# Patient Record
Sex: MALE | Race: WHITE
[De-identification: names, ages, dates, MRNs, and addresses within clinical notes are randomized per-mention and may not be internally consistent; named-entity substitution may affect disease eponyms.]

---

## 2020-10-30 ENCOUNTER — HOSPITAL ENCOUNTER (OUTPATIENT)
Dept: HOSPITAL 56 - MW.ED | Age: 65
Setting detail: OBSERVATION
LOS: 1 days | Discharge: HOME | End: 2020-10-31
Attending: INTERNAL MEDICINE | Admitting: INTERNAL MEDICINE
Payer: MEDICARE

## 2020-10-30 DIAGNOSIS — Z20.828: ICD-10-CM

## 2020-10-30 DIAGNOSIS — Z85.038: ICD-10-CM

## 2020-10-30 DIAGNOSIS — Z79.82: ICD-10-CM

## 2020-10-30 DIAGNOSIS — I10: ICD-10-CM

## 2020-10-30 DIAGNOSIS — I63.9: Primary | ICD-10-CM

## 2020-10-30 DIAGNOSIS — G83.24: ICD-10-CM

## 2020-10-30 DIAGNOSIS — Z98.890: ICD-10-CM

## 2020-10-30 DIAGNOSIS — Z79.899: ICD-10-CM

## 2020-10-30 LAB
BUN SERPL-MCNC: 20 MG/DL (ref 7–18)
CHLORIDE SERPL-SCNC: 104 MMOL/L (ref 98–107)
CO2 SERPL-SCNC: 24.3 MMOL/L (ref 21–32)
GLUCOSE SERPL-MCNC: 110 MG/DL (ref 74–106)
HBA1C BLD-MCNC: 5.6 % (ref 4.5–6.2)
POTASSIUM SERPL-SCNC: 4.1 MMOL/L (ref 3.5–5.1)
SODIUM SERPL-SCNC: 140 MMOL/L (ref 136–148)

## 2020-10-30 PROCEDURE — 97161 PT EVAL LOW COMPLEX 20 MIN: CPT

## 2020-10-30 PROCEDURE — 84484 ASSAY OF TROPONIN QUANT: CPT

## 2020-10-30 PROCEDURE — 36415 COLL VENOUS BLD VENIPUNCTURE: CPT

## 2020-10-30 PROCEDURE — 84100 ASSAY OF PHOSPHORUS: CPT

## 2020-10-30 PROCEDURE — 70450 CT HEAD/BRAIN W/O DYE: CPT

## 2020-10-30 PROCEDURE — 85610 PROTHROMBIN TIME: CPT

## 2020-10-30 PROCEDURE — 80053 COMPREHEN METABOLIC PANEL: CPT

## 2020-10-30 PROCEDURE — 81001 URINALYSIS AUTO W/SCOPE: CPT

## 2020-10-30 PROCEDURE — 85730 THROMBOPLASTIN TIME PARTIAL: CPT

## 2020-10-30 PROCEDURE — 80061 LIPID PANEL: CPT

## 2020-10-30 PROCEDURE — 96372 THER/PROPH/DIAG INJ SC/IM: CPT

## 2020-10-30 PROCEDURE — G0378 HOSPITAL OBSERVATION PER HR: HCPCS

## 2020-10-30 PROCEDURE — 86592 SYPHILIS TEST NON-TREP QUAL: CPT

## 2020-10-30 PROCEDURE — 99285 EMERGENCY DEPT VISIT HI MDM: CPT

## 2020-10-30 PROCEDURE — 85025 COMPLETE CBC W/AUTO DIFF WBC: CPT

## 2020-10-30 PROCEDURE — 70496 CT ANGIOGRAPHY HEAD: CPT

## 2020-10-30 PROCEDURE — 70498 CT ANGIOGRAPHY NECK: CPT

## 2020-10-30 PROCEDURE — 83735 ASSAY OF MAGNESIUM: CPT

## 2020-10-30 PROCEDURE — 85652 RBC SED RATE AUTOMATED: CPT

## 2020-10-30 PROCEDURE — 83036 HEMOGLOBIN GLYCOSYLATED A1C: CPT

## 2020-10-30 PROCEDURE — 70551 MRI BRAIN STEM W/O DYE: CPT

## 2020-10-30 PROCEDURE — U0002 COVID-19 LAB TEST NON-CDC: HCPCS

## 2020-10-30 PROCEDURE — 84443 ASSAY THYROID STIM HORMONE: CPT

## 2020-10-30 PROCEDURE — 93306 TTE W/DOPPLER COMPLETE: CPT

## 2020-10-30 PROCEDURE — 93005 ELECTROCARDIOGRAM TRACING: CPT

## 2020-10-30 RX ADMIN — DIBASIC SODIUM PHOSPHATE, MONOBASIC POTASSIUM PHOSPHATE AND MONOBASIC SODIUM PHOSPHATE SCH MG: 852; 155; 130 TABLET ORAL at 20:58

## 2020-10-30 NOTE — PCM.HP.2
H&P History of Present Illness





- General


Date of Service: 10/30/20


Admit Problem/Dx: 


                           Admission Diagnosis/Problem





Admission Diagnosis/Problem      CVA, Cerebrovascular accident








Source of Information: Patient


History Limitations: Reports: No Limitations





- History of Present Illness


Initial Comments - Free Text/Narative: 





65-year-old male male presents after noticing left hand weakness that started 

this morning. He has a PMH of colon cancer. Patient reports that shortly after 

awakening at 7 AM this morning he noticed his left hand felt weak and felt numb.

He did not have any speech difficulty, facial drooping or any other limb 

weakness. He took 2 baby aspirin prior to coming to the hospital. Patient denies

any fevers, chills, sore throat, cough, SOB, chest pain, nausea, vomiting, 

abdominal pain, diarrhea, blood in stool or blood in urine. 





In the ER, EKG showed normal sinus rhythm. Patient given aspirin 162 mg x 1 and 

Plavix 600 mg x 1. CT head, CTA head and CTA neck were negative for any acute 

findings. MRI brain showed small acute cortical infarct of right mid precentral 

gyrus. ER provided contact neurologist Dr. Monsalve in [place], [place] who advised that 

transfer was not necessary for evaluation by neurologist and that he would be 

available for phone consult if needed. Patient was admitted for further 

evaluation and treatment.





- Related Data


Allergies/Adverse Reactions: 


                                    Allergies











Allergy/AdvReac Type Severity Reaction Status Date / Time


 


No Known Allergies Allergy   Verified 10/05/18 14:07











Home Medications: 


                                    Home Meds





Latanoprost [Xalatan 0.005% Ophth Soln] 1 drop EYEBOTH BEDTIME 10/05/16 

[History]


Aspirin 325 mg PO DAILY 10/05/18 [History]


Sildenafil [Revatio] 2 - 4 tab PO ASDIRECTED PRN 10/05/18 [History]











Past Medical History


HEENT History: Reports: Glaucoma, Other (See Below) (right TMJ problems)


Other HEENT History: uses glasses for driving


Cardiovascular History: Reports: Other (See Below)


Other Cardiovascular History: mild mitral regurgitation per chart, h/o HTN, h/o 

increased cholesterol, small vessel cerebrovascular disease


Respiratory History: Reports: Sleep Apnea, SOB (on exertion)


Other Respiratory History: uses CPAP


Gastrointestinal History: Reports: Colon Polyp, Hiatal Hernia, Other (See Below)

 (diverticulosis  diverticulosis)


Other Gastrointestinal History: hx of colon ca


Other Genitourinary History: UTI in July, 2016


Musculoskeletal History: Reports: Arthritis, Fracture, Gout


Other Musculoskeletal History: only takes medication during flare ups,   hx of 

fx left arm


Neurological History: Reports: Other (See Below)


Other Neuro History: was told he had "mini strokes" and was prescribed a CPAP 

machine


Psychiatric History: Reports: None


Endocrine/Metabolic History: Reports: Obesity/BMI 30+


Hematologic History: Reports: None


Immunologic History: Reports: None


Oncologic (Cancer) History: Reports: None


Dermatologic History: Reports: None





- Past Surgical History


Head Surgeries/Procedures: Reports: None


HEENT Surgical History: Reports: Tonsillectomy


GI Surgical History: Reports: Colonoscopy (x2)


Musculoskeletal Surgical History: Reports: Arthroscopic Knee





- History Comment


History Comment: denies etoh





Social & Family History





- Family History


Family Medical History: Noncontributory





- Tobacco Use


Tobacco Use Status *Q: Never Tobacco User


Second Hand Smoke Exposure: No





- Caffeine Use


Caffeine Use: Reports: Coffee





- Recreational Drug Use


Recreational Drug Use: No





H&P Review of Systems





- Review of Systems:


Review Of Systems: Comprehensive ROS is negative, except as noted in HPI.





Exam





- Exam


Exam: See Below





- Vital Signs


Vital Signs: 


                                Last Vital Signs











Temp  36.2 C   10/30/20 11:06


 


Pulse  56 L  10/30/20 13:54


 


Resp  17   10/30/20 13:54


 


BP  136/87   10/30/20 13:54


 


Pulse Ox  96   10/30/20 13:54











Weight: 85.366 kg





- Exam


General: Alert, Oriented, Cooperative, Other (NAD)


HEENT: Conjunctiva Clear, EOMI, Hearing Intact, Posterior Pharynx Clear, Pupils 

Equal, Pupils Reactive


Neck: Supple, Trachea Midline


Lungs: Clear to Auscultation, Normal Respiratory Effort


Cardiovascular: Regular Rate, Regular Rhythm


GI/Abdominal Exam: Normal Bowel Sounds, Soft, Non-Tender, No Distention


Extremities: Normal Inspection, No Pedal Edema


Neurological: Cranial Nerves Intact, Normal Speech, Normal Tone, Sensation 

Intact, Other (5/5 strength in RUE. 4/5 strength in LUE with weakened  

strength. 5/5 strength in lower extremities b/l.)


Neuro Extensive - Mental Status: Alert, Oriented x3, Normal Mood/Affect


Psychiatric: Alert, Normal Affect, Normal Mood





- Patient Data


Lab Results Last 24 hrs: 


                         Laboratory Results - last 24 hr











  10/30/20 10/30/20 10/30/20 Range/Units





  11:10 11:10 11:10 


 


WBC  8.13    (4.0-11.0)  K/uL


 


RBC  5.23    (4.50-5.90)  M/uL


 


Hgb  16.1    (13.0-17.0)  g/dL


 


Hct  48.1    (38.0-50.0)  %


 


MCV  92.0    (80.0-98.0)  fL


 


MCH  30.8    (27.0-32.0)  pg


 


MCHC  33.5    (31.0-37.0)  g/dL


 


RDW Std Deviation  44.5    (28.0-62.0)  fl


 


RDW Coeff of Violeta  13    (11.0-15.0)  %


 


Plt Count  329    (150-400)  K/uL


 


MPV  10.50    (7.40-12.00)  fL


 


Neut % (Auto)  63.5    (48.0-80.0)  %


 


Lymph % (Auto)  26.1    (16.0-40.0)  %


 


Mono % (Auto)  6.6    (0.0-15.0)  %


 


Eos % (Auto)  3.3    (0.0-7.0)  %


 


Baso % (Auto)  0.5    (0.0-1.5)  %


 


Neut # (Auto)  5.2    (1.4-5.7)  K/uL


 


Lymph # (Auto)  2.1    (0.6-2.4)  K/uL


 


Mono # (Auto)  0.5    (0.0-0.8)  K/uL


 


Eos # (Auto)  0.3    (0.0-0.7)  K/uL


 


Baso # (Auto)  0.0    (0.0-0.1)  K/uL


 


Nucleated RBC %  0.0    /100WBC


 


Nucleated RBCs #  0    K/uL


 


ESR     (0-19)  mm/hr


 


INR   1.07   


 


APTT   27.4   (18.6-31.3)  SEC


 


Sodium    140  (136-148)  mmol/L


 


Potassium    4.1  (3.5-5.1)  mmol/L


 


Chloride    104  ()  mmol/L


 


Carbon Dioxide    24.3  (21.0-32.0)  mmol/L


 


BUN    20 H  (7.0-18.0)  mg/dL


 


Creatinine    1.1  (0.8-1.3)  mg/dL


 


Est Cr Clr Drug Dosing    65.17  mL/min


 


Estimated GFR (MDRD)    > 60.0  ml/min


 


Glucose    110 H  ()  mg/dL


 


Hemoglobin A1c     (4.5-6.2)  %


 


Calcium    9.4  (8.5-10.1)  mg/dL


 


Total Bilirubin    0.4  (0.2-1.0)  mg/dL


 


AST    14 L  (15-37)  IU/L


 


ALT    25  (14-63)  IU/L


 


Alkaline Phosphatase    62  ()  U/L


 


Troponin I    < 0.050  (0.000-0.056)  ng/mL


 


Total Protein    7.4  (6.4-8.2)  g/dL


 


Albumin    4.3  (3.4-5.0)  g/dL


 


Globulin    3.1  (2.6-4.0)  g/dL


 


Albumin/Globulin Ratio    1.4  (0.9-1.6)  


 


TSH 3rd Generation    1.16  (0.36-3.74)  uIU/mL


 


Urine Color     


 


Urine Appearance     


 


Urine pH     (5.0-8.0)  


 


Ur Specific Gravity     (1.001-1.035)  


 


Urine Protein     (NEGATIVE)  mg/dL


 


Urine Glucose (UA)     (NEGATIVE)  mg/dL


 


Urine Ketones     (NEGATIVE)  mg/dL


 


Urine Occult Blood     (NEGATIVE)  


 


Urine Nitrite     (NEGATIVE)  


 


Urine Bilirubin     (NEGATIVE)  


 


Urine Urobilinogen     (<2.0)  EU/dL


 


Ur Leukocyte Esterase     (NEGATIVE)  


 


Urine RBC     (0-2/HPF)  


 


Urine WBC     (0-5/HPF)  


 


Ur Epithelial Cells     (NONE-FEW)  


 


Urine Bacteria     (NEGATIVE)  


 


SARS-CoV-2 RNA (MAXIM)     (NEGATIVE)  














  10/30/20 10/30/20 10/30/20 Range/Units





  11:10 11:10 13:15 


 


WBC     (4.0-11.0)  K/uL


 


RBC     (4.50-5.90)  M/uL


 


Hgb     (13.0-17.0)  g/dL


 


Hct     (38.0-50.0)  %


 


MCV     (80.0-98.0)  fL


 


MCH     (27.0-32.0)  pg


 


MCHC     (31.0-37.0)  g/dL


 


RDW Std Deviation     (28.0-62.0)  fl


 


RDW Coeff of Violeta     (11.0-15.0)  %


 


Plt Count     (150-400)  K/uL


 


MPV     (7.40-12.00)  fL


 


Neut % (Auto)     (48.0-80.0)  %


 


Lymph % (Auto)     (16.0-40.0)  %


 


Mono % (Auto)     (0.0-15.0)  %


 


Eos % (Auto)     (0.0-7.0)  %


 


Baso % (Auto)     (0.0-1.5)  %


 


Neut # (Auto)     (1.4-5.7)  K/uL


 


Lymph # (Auto)     (0.6-2.4)  K/uL


 


Mono # (Auto)     (0.0-0.8)  K/uL


 


Eos # (Auto)     (0.0-0.7)  K/uL


 


Baso # (Auto)     (0.0-0.1)  K/uL


 


Nucleated RBC %     /100WBC


 


Nucleated RBCs #     K/uL


 


ESR  1    (0-19)  mm/hr


 


INR     


 


APTT     (18.6-31.3)  SEC


 


Sodium     (136-148)  mmol/L


 


Potassium     (3.5-5.1)  mmol/L


 


Chloride     ()  mmol/L


 


Carbon Dioxide     (21.0-32.0)  mmol/L


 


BUN     (7.0-18.0)  mg/dL


 


Creatinine     (0.8-1.3)  mg/dL


 


Est Cr Clr Drug Dosing     mL/min


 


Estimated GFR (MDRD)     ml/min


 


Glucose     ()  mg/dL


 


Hemoglobin A1c   5.6   (4.5-6.2)  %


 


Calcium     (8.5-10.1)  mg/dL


 


Total Bilirubin     (0.2-1.0)  mg/dL


 


AST     (15-37)  IU/L


 


ALT     (14-63)  IU/L


 


Alkaline Phosphatase     ()  U/L


 


Troponin I     (0.000-0.056)  ng/mL


 


Total Protein     (6.4-8.2)  g/dL


 


Albumin     (3.4-5.0)  g/dL


 


Globulin     (2.6-4.0)  g/dL


 


Albumin/Globulin Ratio     (0.9-1.6)  


 


TSH 3rd Generation     (0.36-3.74)  uIU/mL


 


Urine Color    YELLOW  


 


Urine Appearance    CLEAR  


 


Urine pH    7.0  (5.0-8.0)  


 


Ur Specific Gravity    <= 1.005  (1.001-1.035)  


 


Urine Protein    NEGATIVE  (NEGATIVE)  mg/dL


 


Urine Glucose (UA)    NEGATIVE  (NEGATIVE)  mg/dL


 


Urine Ketones    NEGATIVE  (NEGATIVE)  mg/dL


 


Urine Occult Blood    NEGATIVE  (NEGATIVE)  


 


Urine Nitrite    NEGATIVE  (NEGATIVE)  


 


Urine Bilirubin    NEGATIVE  (NEGATIVE)  


 


Urine Urobilinogen    0.2  (<2.0)  EU/dL


 


Ur Leukocyte Esterase    NEGATIVE  (NEGATIVE)  


 


Urine RBC    0-1  (0-2/HPF)  


 


Urine WBC    0-1  (0-5/HPF)  


 


Ur Epithelial Cells    RARE  (NONE-FEW)  


 


Urine Bacteria    RARE  (NEGATIVE)  


 


SARS-CoV-2 RNA (MAXIM)     (NEGATIVE)  














  10/30/20 Range/Units





  13:35 


 


WBC   (4.0-11.0)  K/uL


 


RBC   (4.50-5.90)  M/uL


 


Hgb   (13.0-17.0)  g/dL


 


Hct   (38.0-50.0)  %


 


MCV   (80.0-98.0)  fL


 


MCH   (27.0-32.0)  pg


 


MCHC   (31.0-37.0)  g/dL


 


RDW Std Deviation   (28.0-62.0)  fl


 


RDW Coeff of Violeta   (11.0-15.0)  %


 


Plt Count   (150-400)  K/uL


 


MPV   (7.40-12.00)  fL


 


Neut % (Auto)   (48.0-80.0)  %


 


Lymph % (Auto)   (16.0-40.0)  %


 


Mono % (Auto)   (0.0-15.0)  %


 


Eos % (Auto)   (0.0-7.0)  %


 


Baso % (Auto)   (0.0-1.5)  %


 


Neut # (Auto)   (1.4-5.7)  K/uL


 


Lymph # (Auto)   (0.6-2.4)  K/uL


 


Mono # (Auto)   (0.0-0.8)  K/uL


 


Eos # (Auto)   (0.0-0.7)  K/uL


 


Baso # (Auto)   (0.0-0.1)  K/uL


 


Nucleated RBC %   /100WBC


 


Nucleated RBCs #   K/uL


 


ESR   (0-19)  mm/hr


 


INR   


 


APTT   (18.6-31.3)  SEC


 


Sodium   (136-148)  mmol/L


 


Potassium   (3.5-5.1)  mmol/L


 


Chloride   ()  mmol/L


 


Carbon Dioxide   (21.0-32.0)  mmol/L


 


BUN   (7.0-18.0)  mg/dL


 


Creatinine   (0.8-1.3)  mg/dL


 


Est Cr Clr Drug Dosing   mL/min


 


Estimated GFR (MDRD)   ml/min


 


Glucose   ()  mg/dL


 


Hemoglobin A1c   (4.5-6.2)  %


 


Calcium   (8.5-10.1)  mg/dL


 


Total Bilirubin   (0.2-1.0)  mg/dL


 


AST   (15-37)  IU/L


 


ALT   (14-63)  IU/L


 


Alkaline Phosphatase   ()  U/L


 


Troponin I   (0.000-0.056)  ng/mL


 


Total Protein   (6.4-8.2)  g/dL


 


Albumin   (3.4-5.0)  g/dL


 


Globulin   (2.6-4.0)  g/dL


 


Albumin/Globulin Ratio   (0.9-1.6)  


 


TSH 3rd Generation   (0.36-3.74)  uIU/mL


 


Urine Color   


 


Urine Appearance   


 


Urine pH   (5.0-8.0)  


 


Ur Specific Gravity   (1.001-1.035)  


 


Urine Protein   (NEGATIVE)  mg/dL


 


Urine Glucose (UA)   (NEGATIVE)  mg/dL


 


Urine Ketones   (NEGATIVE)  mg/dL


 


Urine Occult Blood   (NEGATIVE)  


 


Urine Nitrite   (NEGATIVE)  


 


Urine Bilirubin   (NEGATIVE)  


 


Urine Urobilinogen   (<2.0)  EU/dL


 


Ur Leukocyte Esterase   (NEGATIVE)  


 


Urine RBC   (0-2/HPF)  


 


Urine WBC   (0-5/HPF)  


 


Ur Epithelial Cells   (NONE-FEW)  


 


Urine Bacteria   (NEGATIVE)  


 


SARS-CoV-2 RNA (MAXIM)  NEGATIVE  (NEGATIVE)  











Result Diagrams: 


                                 10/30/20 11:10





                                 10/30/20 11:10





Sepsis Event Note





- Evaluation


Sepsis Screening Result: No Definite Risk





- Focused Exam


Vital Signs: 


                                   Vital Signs











  Temp Pulse Resp BP Pulse Ox


 


 10/30/20 13:54   56 L  17  136/87  96


 


 10/30/20 11:51   58 L  17  142/88 H  96


 


 10/30/20 11:36   60  17  149/92 H  96


 


 10/30/20 11:21   58 L  17  142/88 H  97


 


 10/30/20 11:06  36.2 C  55 L  17  152/102 H  97














- Problem List


(1) CVA (cerebral vascular accident)


SNOMED Code(s): 404982572


   ICD Code: I63.9 - CEREBRAL INFARCTION, UNSPECIFIED   Status: Acute   Current 

Visit: Yes   


Qualifiers: 


   CVA mechanism: thrombosis   Precerebral and cerebral artery: unspecified 

precerebral artery   Qualified Code(s): I63.00 - Cerebral infarction due to 

thrombosis of unspecified precerebral artery   





(2) History of colon cancer


SNOMED Code(s): 554075883


   ICD Code: Z85.038 - PERSONAL HISTORY OF MALIGNANT NEOPLASM OF LARGE INTESTINE

   Status: Acute   Current Visit: Yes   


Problem List Initiated/Reviewed/Updated: Yes


Orders Last 24hrs: 


                               Active Orders 24 hr











 Category Date Time Status


 


 Patient Status [ADT] Routine ADT  10/30/20 14:48 Active


 


 Bedrest [RC] ASDIRECTED Care  10/30/20 11:01 Active


 


 Cardiac Monitoring [RC] .AS DIRECTED Care  10/30/20 11:01 Active


 


 EKG Documentation Completion [RC] STAT Care  10/30/20 11:01 Active


 


 Initiate Acute Stroke Protocol [RC] STAT Care  10/30/20 11:01 Active


 


 NIH Stroke Scale [RC] ASDIRECTED Care  10/30/20 11:01 Active


 


 Neuro Check [RC] Q2H Care  10/30/20 14:54 Active


 


 Nursing Bedside Swallow Screen [RC] ASDIRECTED Care  10/30/20 14:52 Active


 


 Oxygen Therapy [RC] ASDIRECTED Care  10/30/20 11:01 Active


 


 Oxygen Therapy [RC] PRN Care  10/30/20 14:50 Active


 


 Up ad Ernestine [RC] ASDIRECTED Care  10/30/20 14:50 Active


 


 VTE/DVT Education [RC] PER UNIT ROUTINE Care  10/30/20 14:50 Active


 


 Vital Signs [RC] Q15M Care  10/30/20 11:01 Active


 


 Vital Signs [RC] Q4H Care  10/30/20 14:50 Active


 


 OT Evaluation and Treatment [CONS] Routine Cons  10/30/20 14:50 Active


 


 PT Evaluation and Treatment [CONS] Routine Cons  10/30/20 14:50 Active


 


 Echo Comp wo Cont [US] Urgent Exams  10/30/20 14:55 Ordered


 


 CBC WITH AUTO DIFF [HEME] AM Lab  10/31/20 05:11 Ordered


 


 COMPREHENSIVE METABOLIC PN,CMP [CHEM] AM Lab  10/31/20 05:11 Ordered


 


 LIPID PANEL [CHEM] Routine Lab  10/30/20 14:53 Ordered


 


 MAGNESIUM [CHEM] Routine Lab  10/30/20 14:53 Ordered


 


 PHOSPHORUS [CHEM] Routine Lab  10/30/20 14:53 Ordered


 


 RPR (SYPHILIS SERO) W/ RFLX [REF] Stat Lab  10/30/20 11:10 Received


 


 Acetaminophen [TylenoL] Med  10/30/20 14:50 Active





 650 mg PO Q4H PRN   


 


 Aspirin Med  10/31/20 09:00 Active





 81 mg PO DAILY   


 


 Enoxaparin [Lovenox] Med  10/30/20 21:00 Active





 40 mg SUBCUT Q24H   


 


 Ondansetron [Zofran] Med  10/30/20 14:50 Active





 4 mg IVPUSH Q4H PRN   


 


 Sodium Chloride 0.9% [Normal Saline] Med  10/30/20 11:01 Active





 10 ml IV ASDIRECTED PRN   


 


 Sodium Chloride 0.9% [Saline Flush] Med  10/30/20 11:01 Active





 10 ml FLUSH ASDIRECTED PRN   


 


 Sodium Chloride 0.9% [Saline Flush] Med  10/30/20 11:01 Active





 2.5 ml FLUSH ASDIRECTED PRN   


 


 atorvaSTATin [Lipitor] Med  10/31/20 09:00 Active





 40 mg PO DAILY   


 


 Peripheral IV Insertion Adult [OM.PC] Stat Oth  10/30/20 11:01 Ordered


 


 Peripheral IV Insertion Adult [OM.PC] Stat Oth  10/30/20 11:01 Ordered


 


 Resuscitation Status Routine Resus Stat  10/30/20 14:50 Ordered








                                Medication Orders





Acetaminophen (Tylenol)  650 mg PO Q4H PRN


   PRN Reason: Pain (Mild 1-3)/fever


Aspirin (Aspirin)  81 mg PO DAILY KAMRAN


Atorvastatin Calcium (Lipitor)  40 mg PO DAILY KAMRAN


Enoxaparin Sodium (Lovenox)  40 mg SUBCUT Q24H KAMRAN


Ondansetron HCl (Zofran)  4 mg IVPUSH Q4H PRN


   PRN Reason: Nausea


Sodium Chloride (Saline Flush)  10 ml FLUSH ASDIRECTED PRN


   PRN Reason: Keep Vein Open


   Last Admin: 10/30/20 12:54  Dose: 10 ml


   Documented by: ESTEE


Sodium Chloride (Saline Flush)  2.5 ml FLUSH ASDIRECTED PRN


   PRN Reason: Keep Vein Open


   Last Admin: 10/30/20 12:54  Dose: 2.5 ml


   Documented by: ESTEE


Sodium Chloride (Normal Saline)  10 ml IV ASDIRECTED PRN


   PRN Reason: IV Use








Assessment/Plan Comment:: 





Assessment and Plan:





1. CVA:


- Admit to med/surg. Patient on telemetry. Will order ECHO. Neuro checks q4h. 

Will order bedside swallow study prior to starting diet. Will check HgbA1c, 

lipid panel and TSH. Continue aspirin and atorvastatin. Will allow for 

permissive HTN.


- MRI brain showed small acute cortical infarct of right mid precentral gyrus. 

There is are also small chronic infarcts in left anterior frontal lobe and bila

teral cerebellar hemispheres.


- CT head, CTA head and neck were negative. 





2. Left upper extremity weakness secondary to #1:


- Consult PT/OT.





3. DVT prophylaxis:


- Lovenox 40 mg subcut qd.

## 2020-10-30 NOTE — CT
DATE: 10/30/2020. 



CLINICAL HISTORY:



Patient with acute onset of left upper extremity weakness. 



TECHNIQUE:



Standard helical CT image acquisition of the brain following by standard 

helical CT image acquisition through the head and neck after intravenous 

contrast bolus enhancement. Multiplanar reconstructed images performed on a 

separate workstation.



COMPARISON:



Head CT dated 03/29/2013. 



FINDINGS:



CT HEAD:



There is no intracranial hemorrhage.



No extra-axial collection, mass effect, or midline shift.



Patchy hypoattenuation within the white matter of both hemispheres likely 

reflects sequela of chronic small vessel ischemia.



Ventricles are normal in size and morphology for patient age.



The calvarium is unremarkable.



The orbits are unremarkable.



Findings consistent with chronic sinusitis involving the bilateral 

maxillary and frontal sinuses as well as the anterior ethmoid air cells. 



The mastoid air cells are unremarkable.



The soft tissues are unremarkable.



CT ANGIOGRAM HEAD AND NECK:



The origins of the great vessels from the aortic arch are patent. The 

origins of the right and left vertebral arteries are patent.



The common carotid arteries are patent.



There is no significant stenosis at the origin of the right internal 

carotid artery.



There is no significant stenosis at the origin of the left internal carotid 

artery.



The rest of the cervical segments of the internal carotid arteries are 

patent up to their intracranial segments, noting marked tortuosity the 

proximal and mid cervical segment of the right internal carotid artery and 

a tortuous subpetrosal loop of the distal cervical left internal carotid 

artery. 



The intracranial segments of the internal carotid arteries are patent.



The anterior and middle cerebral arteries are patent. Note is made of early 

trifurcation of the left middle cerebral artery. The anterior communicating 

artery is visualized and is within normal limits.



The vertebral arteries are codominant. The cervical segments of the 

vertebral arteries are patent. The intracranial segments of the vertebral 

arteries are patent.



The basilar trunk and posterior cerebral arteries are patent.



There is normal opacification of major intracranial venous structures.



The visualized lung apices are unremarkable



There are multiple small nodules within the right greater than left thyroid 

lobes with the largest measuring approximately 7 mm and located within the 

posterior right lobe. 



There are degenerative changes in the cervical spine.



IMPRESSION:



1. No acute intracranial hemorrhage. 



2. Findings consistent with sequela of chronic small vessel ischemia. 



3. Normal CT angiogram of the head and neck. More specifically, no evidence 

of intracranial proximal large vessel occlusion or hemodynamically 

significant stenosis within the cervical arterial vasculature.



Please note that all CT scans at this facility use dose modulation, 

iterative reconstruction, and/or weight-based dosing when appropriate to 

reduce radiation dose to as low as reasonably achievable.



Dictated by Fantasma Covington MD @ Oct 30 2020 11:34AM



Signed by Dr. Fantasma Covington @ Oct 30 2020 12:05PM

## 2020-10-30 NOTE — CT
DATE: 10/30/2020. 



CLINICAL HISTORY:



Patient with acute onset of left upper extremity weakness. 



TECHNIQUE:



Standard helical CT image acquisition of the brain following by standard 

helical CT image acquisition through the head and neck after intravenous 

contrast bolus enhancement. Multiplanar reconstructed images performed on a 

separate workstation.



COMPARISON:



Head CT dated 03/29/2013. 



FINDINGS:



CT HEAD:



There is no intracranial hemorrhage.



No extra-axial collection, mass effect, or midline shift.



Patchy hypoattenuation within the white matter of both hemispheres likely 

reflects sequela of chronic small vessel ischemia.



Ventricles are normal in size and morphology for patient age.



The calvarium is unremarkable.



The orbits are unremarkable.



Findings consistent with chronic sinusitis involving the bilateral 

maxillary and frontal sinuses as well as the anterior ethmoid air cells. 



The mastoid air cells are unremarkable.



The soft tissues are unremarkable.



CT ANGIOGRAM HEAD AND NECK:



The origins of the great vessels from the aortic arch are patent. The 

origins of the right and left vertebral arteries are patent.



The common carotid arteries are patent.



There is no significant stenosis at the origin of the right internal 

carotid artery.



There is no significant stenosis at the origin of the left internal carotid 

artery.



The rest of the cervical segments of the internal carotid arteries are 

patent up to their intracranial segments, noting marked tortuosity the 

proximal and mid cervical segment of the right internal carotid artery and 

a tortuous subpetrosal loop of the distal cervical left internal carotid 

artery. 



The intracranial segments of the internal carotid arteries are patent.



The anterior and middle cerebral arteries are patent. Note is made of early 

trifurcation of the left middle cerebral artery. The anterior communicating 

artery is visualized and is within normal limits.



The vertebral arteries are codominant. The cervical segments of the 

vertebral arteries are patent. The intracranial segments of the vertebral 

arteries are patent.



The basilar trunk and posterior cerebral arteries are patent.



There is normal opacification of major intracranial venous structures.



The visualized lung apices are unremarkable



There are multiple small nodules within the right greater than left thyroid 

lobes with the largest measuring approximately 7 mm and located within the 

posterior right lobe. 



There are degenerative changes in the cervical spine.



IMPRESSION:



1. No acute intracranial hemorrhage. 



2. Findings consistent with sequela of chronic small vessel ischemia. 



3. Normal CT angiogram of the head and neck. More specifically, no evidence 

of intracranial proximal large vessel occlusion or hemodynamically 

significant stenosis within the cervical arterial vasculature.



Please note that all CT scans at this facility use dose modulation, 

iterative reconstruction, and/or weight-based dosing when appropriate to 

reduce radiation dose to as low as reasonably achievable.



Dictated by Fantasma Covington MD @ Oct 30 2020 11:34AM



Signed by Dr. Fantasma Covington @ Oct 30 2020 12:06PM

## 2020-10-30 NOTE — EDM.PDOC
ED HPI GENERAL MEDICAL PROBLEM





- General


Chief Complaint: Neuro Symptoms/Deficits


Stated Complaint: STROKE SYMPTOMS


Time Seen by Provider: 10/30/20 11:05


Source of Information: Reports: Patient


History Limitations: Reports: No Limitations





- History of Present Illness


INITIAL COMMENTS - FREE TEXT/NARRATIVE: 





65-year-old male no significant past medical history presents for concern for 

stroke.  Patient was seen in an outpatient clinic by  this morning.  

He woke up around 7 AM and states that shortly after waking up he noticed 

weakness of his left upper extremity.  It is primarily noticed in his fourth and

fifth digits of his left hand.  No associated numbness or tingling sensation, 

sensory deficit.  No weakness of lower extremities.  Denies slurred speech, 

confusion, word finding difficulty.  He took 2 baby aspirins at home prior to 

arrival.  He thinks that symptoms were not present after waking up and states 

that they happen shortly after waking up, but he says that he cannot be certain.





- Related Data


                                    Allergies











Allergy/AdvReac Type Severity Reaction Status Date / Time


 


No Known Allergies Allergy   Verified 10/05/18 14:07











Home Meds: 


                                    Home Meds





Latanoprost [Xalatan 0.005% Ophth Soln] 1 drop EYEBOTH BEDTIME 10/05/16 

[History]


Aspirin 325 mg PO DAILY 10/05/18 [History]


Sildenafil [Revatio] 2 - 4 tab PO ASDIRECTED PRN 10/05/18 [History]











Past Medical History


HEENT History: Reports: Glaucoma, Other (See Below) (right TMJ problems)


Other HEENT History: uses glasses for driving


Cardiovascular History: Reports: Other (See Below)


Other Cardiovascular History: mild mitral regurgitation per chart, h/o HTN, h/o 

increased cholesterol, small vessel cerebrovascular disease


Respiratory History: Reports: Sleep Apnea, SOB (on exertion)


Other Respiratory History: uses CPAP


Gastrointestinal History: Reports: Colon Polyp, Hiatal Hernia, Other (See Below)

 (diverticulosis  diverticulosis)


Other Genitourinary History: UTI in July, 2016


Musculoskeletal History: Reports: Arthritis, Fracture, Gout


Other Musculoskeletal History: only takes medication during flare ups,   hx of 

fx left arm


Neurological History: Reports: Other (See Below)


Other Neuro History: was told he had "mini strokes" and was prescribed a CPAP 

machine


Psychiatric History: Reports: None


Endocrine/Metabolic History: Reports: Obesity/BMI 30+


Hematologic History: Reports: None


Immunologic History: Reports: None


Oncologic (Cancer) History: Reports: None


Dermatologic History: Reports: None





- Past Surgical History


Head Surgeries/Procedures: Reports: None


HEENT Surgical History: Reports: Tonsillectomy


GI Surgical History: Reports: Colonoscopy (x2)


Musculoskeletal Surgical History: Reports: Arthroscopic Knee





- History Comment


History Comment: denies etoh





ED ROS GENERAL





- Review of Systems


Review Of Systems: Comprehensive ROS is negative, except as noted in HPI.





ED EXAM, GENERAL





- Physical Exam


Exam: See Below


Exam Limited By: No Limitations


General Appearance: Alert, WD/WN, No Apparent Distress


Eye Exam: Bilateral Eye: EOMI, PERRL


Ears: Normal External Exam


Nose: Normal Inspection


Throat/Mouth: Normal Inspection, Normal Voice, No Airway Compromise, Perioral 

Cyanosis


Head: Atraumatic


Neck: Normal Inspection


Respiratory/Chest: No Respiratory Distress, Lungs Clear, Normal Breath Sounds, 

No Accessory Muscle Use


Cardiovascular: Normal Peripheral Pulses, Regular Rate, Rhythm, No Edema


GI/Abdominal: Soft, Non-Tender


Back Exam: Normal Inspection


Extremities: Normal Inspection


Neurological: Alert, Oriented, CN II-XII Intact, Normal Cognition, Normal Gait, 

Other (no sensory deficits, no pronater drift b/l UE, no drift LE, normal mm 

strength b/l LE, normal  strength RUE, reduced  strength 4/5 LUE)


Psychiatric: Normal Affect, Normal Mood


Skin Exam: Warm, Dry, Intact, Normal Color


  ** #1 Interpretation


EKG Date: 10/30/20


Time: 11:00


Rhythm: NSR


Rate (Beats/Min): 65


Axis: Normal


P-Wave: Present


QRS: Normal


ST-T: Normal


QT: Normal


NH/PQ Interval: 186





Course





- Vital Signs


Last Recorded V/S: 


                                Last Vital Signs











Temp  97.2 F   10/30/20 11:06


 


Pulse  55 L  10/30/20 11:06


 


Resp  17   10/30/20 11:06


 


BP  152/102 H  10/30/20 11:06


 


Pulse Ox  97   10/30/20 11:06














- Orders/Labs/Meds


Orders: 


                               Active Orders 24 hr











 Category Date Time Status


 


 Assess Neurological Status [RC] ASDIRECTED Care  10/30/20 11:01 Active


 


 Bedrest [RC] ASDIRECTED Care  10/30/20 11:01 Active


 


 Cardiac Monitoring [RC] .AS DIRECTED Care  10/30/20 11:01 Active


 


 EKG Documentation Completion [RC] STAT Care  10/30/20 11:01 Active


 


 Initiate Acute Stroke Protocol [RC] STAT Care  10/30/20 11:01 Active


 


 NIH Stroke Scale [RC] ASDIRECTED Care  10/30/20 11:01 Active


 


 Nursing Bedside Swallow Screen [RC] ASDIRECTED Care  10/30/20 11:01 Active


 


 Oxygen Therapy [RC] ASDIRECTED Care  10/30/20 11:01 Active


 


 Vital Signs [RC] Q15M Care  10/30/20 11:01 Active


 


 COMPREHENSIVE METABOLIC PN,CMP [CHEM] Stat Lab  10/30/20 11:10 Received


 


 TROPONIN I [CHEM] Stat Lab  10/30/20 11:10 Received


 


 TSH [CHEM] Stat Lab  10/30/20 11:10 Received


 


 Sodium Chloride 0.9% [Normal Saline] Med  10/30/20 11:01 Active





 10 ml IV ASDIRECTED PRN   


 


 Sodium Chloride 0.9% [Saline Flush] Med  10/30/20 11:01 Active





 10 ml FLUSH ASDIRECTED PRN   


 


 Sodium Chloride 0.9% [Saline Flush] Med  10/30/20 11:01 Active





 2.5 ml FLUSH ASDIRECTED PRN   


 


 Peripheral IV Insertion Adult [OM.PC] Stat Oth  10/30/20 11:01 Ordered


 


 Peripheral IV Insertion Adult [OM.PC] Stat Oth  10/30/20 11:01 Ordered








                                Medication Orders





Sodium Chloride (Saline Flush)  10 ml FLUSH ASDIRECTED PRN


   PRN Reason: Keep Vein Open


   Last Admin: 10/30/20 12:54  Dose: 10 ml


   Documented by: ARANANG


Sodium Chloride (Saline Flush)  2.5 ml FLUSH ASDIRECTED PRN


   PRN Reason: Keep Vein Open


   Last Admin: 10/30/20 12:54  Dose: 2.5 ml


   Documented by: ARANANG


Sodium Chloride (Normal Saline)  10 ml IV ASDIRECTED PRN


   PRN Reason: IV Use








Labs: 


                                Laboratory Tests











  10/30/20 10/30/20 10/30/20 Range/Units





  11:10 11:10 13:15 


 


WBC  8.13    (4.0-11.0)  K/uL


 


RBC  5.23    (4.50-5.90)  M/uL


 


Hgb  16.1    (13.0-17.0)  g/dL


 


Hct  48.1    (38.0-50.0)  %


 


MCV  92.0    (80.0-98.0)  fL


 


MCH  30.8    (27.0-32.0)  pg


 


MCHC  33.5    (31.0-37.0)  g/dL


 


RDW Std Deviation  44.5    (28.0-62.0)  fl


 


RDW Coeff of Violeta  13    (11.0-15.0)  %


 


Plt Count  329    (150-400)  K/uL


 


MPV  10.50    (7.40-12.00)  fL


 


Neut % (Auto)  63.5    (48.0-80.0)  %


 


Lymph % (Auto)  26.1    (16.0-40.0)  %


 


Mono % (Auto)  6.6    (0.0-15.0)  %


 


Eos % (Auto)  3.3    (0.0-7.0)  %


 


Baso % (Auto)  0.5    (0.0-1.5)  %


 


Neut # (Auto)  5.2    (1.4-5.7)  K/uL


 


Lymph # (Auto)  2.1    (0.6-2.4)  K/uL


 


Mono # (Auto)  0.5    (0.0-0.8)  K/uL


 


Eos # (Auto)  0.3    (0.0-0.7)  K/uL


 


Baso # (Auto)  0.0    (0.0-0.1)  K/uL


 


Nucleated RBC %  0.0    /100WBC


 


Nucleated RBCs #  0    K/uL


 


INR   1.07   


 


APTT   27.4   (18.6-31.3)  SEC


 


Urine Color    YELLOW  


 


Urine Appearance    CLEAR  


 


Urine pH    7.0  (5.0-8.0)  


 


Ur Specific Gravity    <= 1.005  (1.001-1.035)  


 


Urine Protein    NEGATIVE  (NEGATIVE)  mg/dL


 


Urine Glucose (UA)    NEGATIVE  (NEGATIVE)  mg/dL


 


Urine Ketones    NEGATIVE  (NEGATIVE)  mg/dL


 


Urine Occult Blood    NEGATIVE  (NEGATIVE)  


 


Urine Nitrite    NEGATIVE  (NEGATIVE)  


 


Urine Bilirubin    NEGATIVE  (NEGATIVE)  


 


Urine Urobilinogen    0.2  (<2.0)  EU/dL


 


Ur Leukocyte Esterase    NEGATIVE  (NEGATIVE)  


 


Urine RBC    0-1  (0-2/HPF)  


 


Urine WBC    0-1  (0-5/HPF)  


 


Ur Epithelial Cells    RARE  (NONE-FEW)  


 


Urine Bacteria    RARE  (NEGATIVE)  











Meds: 


Medications











Generic Name Dose Route Start Last Admin





  Trade Name Freq  PRN Reason Stop Dose Admin


 


Sodium Chloride  10 ml  10/30/20 11:01  10/30/20 12:54





  Saline Flush  FLUSH   10 ml





  ASDIRECTED PRN   Administration





  Keep Vein Open  


 


Sodium Chloride  2.5 ml  10/30/20 11:01  10/30/20 12:54





  Saline Flush  FLUSH   2.5 ml





  ASDIRECTED PRN   Administration





  Keep Vein Open  


 


Sodium Chloride  10 ml  10/30/20 11:01 





  Normal Saline  IV  





  ASDIRECTED PRN  





  IV Use  














Discontinued Medications














Generic Name Dose Route Start Last Admin





  Trade Name Robertoq  PRN Reason Stop Dose Admin


 


Aspirin  162 mg  10/30/20 13:06  10/30/20 13:28





  Aspirin  PO  10/30/20 13:07  162 mg





  ONETIME ONE   Administration


 


Clopidogrel Bisulfate  600 mg  10/30/20 13:05  10/30/20 13:29





  Plavix  PO  10/30/20 13:06  600 mg





  ONETIME ONE   Administration


 


Iopamidol  100 ml  10/30/20 11:29  10/30/20 11:29





  Isovue Multipack-370 (76%)  IVPUSH  10/30/20 11:30  100 ml





  ONETIME ONE   Administration














- Re-Assessments/Exams


Free Text/Narrative Re-Assessment/Exam: 





10/30/20 11:23


Patient presents with concern for CVA.  Physical exam is remarkable for mild 

left upper extremity weakness.  Possible ulnar nerve neuropathy considering 

distribution of patient's symptoms.  A stroke code was called, and patient was 

taken for emergent head CT which was read by me as nonhemorrhagic.  We will 

follow up CTA head and neck, MRI brain, labs, and disposition accordingly


10/30/20 12:14


CT head, CTA head and neck both normal.  Patient was able to go for MRI, results

 are pending.  Will follow up and disposition accordingly.


10/30/20 13:33


Spoke with neurologist at my not Dr. Monsalve who recommends physical therapy 

consult, occupational therapy consult, carotid Doppler ultrasounds, TSH, RPR, 

ESR, fasting lipid panel, hemoglobin A1c, and starting a statin medication.  He 

does not think that the patient requires transfer if this can all be done at our

 hospital.  He notes that he is available for consultation should the inpatient 

team have any questions.


10/30/20 13:48


Hospitalist is comfortable with plan.  Will admit to hospital





Departure





- Departure


Time of Disposition: 13:48


Disposition: Admitted As Inpatient 64


Condition: Good


Clinical Impression: 


CVA (cerebral vascular accident)


Qualifiers:


 CVA mechanism: thrombosis Precerebral and cerebral artery: unspecified 

precerebral artery Qualified Code(s): I63.00 - Cerebral infarction due to 

thrombosis of unspecified precerebral artery








- Discharge Information


Referrals: 


Burt Salgado MD [Primary Care Provider] - 


Forms:  ED Department Discharge





Sepsis Event Note (ED)





- Focused Exam


Vital Signs: 


                                   Vital Signs











  Temp Pulse Resp BP Pulse Ox


 


 10/30/20 11:06  97.2 F  55 L  17  152/102 H  97

## 2020-10-30 NOTE — MR
INDICATION:



Left upper extremity weakness.



TECHNIQUE:



Brain MRI without contrast.



The following sequences were obtained:



3D T1 weighted sequence.



DWI and ADC mapping sequences.



Axial FLAIR and MELITON T2 weighted sequences.



Axial SWI sequence.



COMPARISON:



Head CT from 10/30/2020.



FINDINGS:



Small oblong focus of true diffusion restriction involving the right mid 

precentral gyrus and subjacent centrum semiovale, compatible with an acute 

infarction no recent infarcts elsewhere within the brain. Tiny chronic 

infarct left anterior frontal lobe. Several small chronic infarcts within 

the bilateral cerebellar hemispheres. No evidence of acute or chronic 

intracranial blood products. Mild generalized parenchymal volume loss. 

Patchy T2 hyperintensities within the supratentorial white matter, most 

likely reflecting chronic microvascular ischemic changes. No mass effect or 

herniation. No hydrocephalus or extra-axial collections. The pituitary 

gland, parasellar structures and optic chiasm are normal. All the major 

intracranial vascular structures demonstrate normal flow-related signal. 



The orbital contents are normal. No calvarial or skull base marrow signal 

abnormality. Diffuse moderate paranasal sinus mucosal thickening. No 

extracranial soft tissue findings.



IMPRESSION:



1. Small acute cortical infarct right mid precentral gyrus, which 

corresponds to the provided symptoms of left upper extremity weakness. No 

acute infarctions elsewhere within the brain. 



2. No acute or chronic intracranial hemorrhage. 



3. Small chronic cortical infarct left anterior frontal lobe. Small chronic 

infarcts bilateral cerebellar hemispheres. Minimal chronic microvascular 

ischemic changes within supratentorial white matter.



Dictated by French Cai MD @ Oct 30 2020 12:34PM



Signed by Dr. French Cai @ Oct 30 2020 12:40PM

## 2020-10-31 VITALS — DIASTOLIC BLOOD PRESSURE: 70 MMHG | SYSTOLIC BLOOD PRESSURE: 102 MMHG

## 2020-10-31 VITALS — HEART RATE: 63 BPM

## 2020-10-31 LAB
BUN SERPL-MCNC: 18 MG/DL (ref 7–18)
CHLORIDE SERPL-SCNC: 106 MMOL/L (ref 98–107)
CO2 SERPL-SCNC: 29 MMOL/L (ref 21–32)
GLUCOSE SERPL-MCNC: 98 MG/DL (ref 74–106)
POTASSIUM SERPL-SCNC: 4.5 MMOL/L (ref 3.5–5.1)
SODIUM SERPL-SCNC: 142 MMOL/L (ref 136–148)

## 2020-10-31 RX ADMIN — DIBASIC SODIUM PHOSPHATE, MONOBASIC POTASSIUM PHOSPHATE AND MONOBASIC SODIUM PHOSPHATE SCH MG: 852; 155; 130 TABLET ORAL at 09:15

## 2020-10-31 NOTE — [id number]
**Discharge Summary





- Hospital Course


Free Text/Narrative:: 





65-year-old male admitted for acute CVA. He has a PMH of RENE on CPAP and colon 

CA. Patient presented with complaints of LUE weakness. CT head, CTA head and 

neck were negative. MRI brain showed small acute cortical infarct of right mid 

precentral gyrus. There are also small chronic infarcts in the left anterior 

frontal lobe and bilateral cerebellar hemispheres. ECHO was ordered and is 

pending at time of discharge. PT consulted and recommended outpatient follow-up 

with occupational therapy. Patient discharged in stable condition with scripts 

for baby aspirin and atorvastatin daily. Advised to follow-up with PCP and 

neurology on discharge. All questions were addressed.





- Discharge Data


Discharge Date: 10/31/20


Discharge Disposition: Home, Self-Care 01


Condition: Stable





- Referral to Home Health


Primary Care Physician: 


Burt Salgado MD








- Discharge Diagnosis/Problem(s)


(1) CVA (cerebral vascular accident)


SNOMED Code(s): 243471101


   ICD Code: I63.9 - CEREBRAL INFARCTION, UNSPECIFIED   Status: Acute   Current 

Visit: Yes   


Qualifiers: 


   CVA mechanism: thrombosis   Precerebral and cerebral artery: unspecified 

precerebral artery   Qualified Code(s): I63.00 - Cerebral infarction due to 

thrombosis of unspecified precerebral artery   





(2) History of colon cancer


SNOMED Code(s): 589821262


   ICD Code: Z85.038 - PERSONAL HISTORY OF MALIGNANT NEOPLASM OF LARGE INTESTINE

  Status: Acute   Current Visit: Yes   





- Patient Summary/Data


Consults: 


                                  Consultations





10/30/20 14:50


OT Evaluation and Treatment [CONS] Routine 


PT Evaluation and Treatment [CONS] Routine 














- Patient Instructions


Diet: Usual Diet as Tolerated


Activity: As Tolerated


Notify Provider of: Fever, Increased Pain, Swelling and Redness, Drainage, 

Nausea and/or Vomiting


Other/Special Instructions: Follow-up with occupational therapy. Phone number 

provided.





- Discharge Plan


*PRESCRIPTION DRUG MONITORING PROGRAM REVIEWED*: Not Applicable


*COPY OF PRESCRIPTION DRUG MONITORING REPORT IN PATIENT LUBNA: Not Applicable


Prescriptions/Med Rec: 


Aspirin 81 mg PO DAILY 30 Days #30 tab.chew


atorvaSTATin [Lipitor] 40 mg PO DAILY 30 Days #30 tablet


Home Medications: 


                                    Home Meds





Latanoprost [Xalatan 0.005% Ophth Soln] 1 drop EYEBOTH BEDTIME 10/05/16 

[History]


Aspirin 81 mg PO DAILY 30 Days #30 tab.chew 10/31/20 [Rx]


atorvaSTATin [Lipitor] 40 mg PO DAILY 30 Days #30 tablet 10/31/20 [Rx]








Oxygen Therapy Mode: Room Air


Patient Handouts:  Stroke Prevention, Easy-to-Read, Transient Ischemic Attack, 

Easy-to-Read, Atorvastatin tablets, Aspirin, ASA oral tablets


Referrals: 


Burt Salgado MD [Primary Care Provider] - 


Sangita Mcdonnell MD [Physician] - 





- Discharge Summary/Plan Comment


DC Time >30 min.: No





- Patient Data


Vitals - Most Recent: 


                                Last Vital Signs











Temp  36.9 C   10/31/20 03:46


 


Pulse  63   10/31/20 03:46


 


Resp  18   10/31/20 03:46


 


BP  102/70   10/31/20 03:46


 


Pulse Ox  97   10/31/20 03:46











Weight - Most Recent: 87.362 kg


I&O - Last 24 hours: 


                                 Intake & Output











 10/30/20 10/31/20 10/31/20





 22:59 06:59 14:59


 


Intake Total  650 


 


Output Total  700 


 


Balance  -50 











Lab Results - Last 24 hrs: 


                         Laboratory Results - last 24 hr











  10/30/20 10/30/20 10/30/20 Range/Units





  11:10 11:10 11:10 


 


WBC  8.13    (4.0-11.0)  K/uL


 


RBC  5.23    (4.50-5.90)  M/uL


 


Hgb  16.1    (13.0-17.0)  g/dL


 


Hct  48.1    (38.0-50.0)  %


 


MCV  92.0    (80.0-98.0)  fL


 


MCH  30.8    (27.0-32.0)  pg


 


MCHC  33.5    (31.0-37.0)  g/dL


 


RDW Std Deviation  44.5    (28.0-62.0)  fl


 


RDW Coeff of Violeta  13    (11.0-15.0)  %


 


Plt Count  329    (150-400)  K/uL


 


MPV  10.50    (7.40-12.00)  fL


 


Neut % (Auto)  63.5    (48.0-80.0)  %


 


Lymph % (Auto)  26.1    (16.0-40.0)  %


 


Mono % (Auto)  6.6    (0.0-15.0)  %


 


Eos % (Auto)  3.3    (0.0-7.0)  %


 


Baso % (Auto)  0.5    (0.0-1.5)  %


 


Neut # (Auto)  5.2    (1.4-5.7)  K/uL


 


Lymph # (Auto)  2.1    (0.6-2.4)  K/uL


 


Mono # (Auto)  0.5    (0.0-0.8)  K/uL


 


Eos # (Auto)  0.3    (0.0-0.7)  K/uL


 


Baso # (Auto)  0.0    (0.0-0.1)  K/uL


 


Nucleated RBC %  0.0    /100WBC


 


Nucleated RBCs #  0    K/uL


 


ESR     (0-19)  mm/hr


 


INR   1.07   


 


APTT   27.4   (18.6-31.3)  SEC


 


Sodium    140  (136-148)  mmol/L


 


Potassium    4.1  (3.5-5.1)  mmol/L


 


Chloride    104  ()  mmol/L


 


Carbon Dioxide    24.3  (21.0-32.0)  mmol/L


 


BUN    20 H  (7.0-18.0)  mg/dL


 


Creatinine    1.1  (0.8-1.3)  mg/dL


 


Est Cr Clr Drug Dosing    65.17  mL/min


 


Estimated GFR (MDRD)    > 60.0  ml/min


 


Glucose    110 H  ()  mg/dL


 


Hemoglobin A1c     (4.5-6.2)  %


 


Calcium    9.4  (8.5-10.1)  mg/dL


 


Phosphorus     (2.6-4.7)  mg/dL


 


Magnesium     (1.8-2.4)  mg/dL


 


Total Bilirubin    0.4  (0.2-1.0)  mg/dL


 


AST    14 L  (15-37)  IU/L


 


ALT    25  (14-63)  IU/L


 


Alkaline Phosphatase    62  ()  U/L


 


Troponin I    < 0.050  (0.000-0.056)  ng/mL


 


Total Protein    7.4  (6.4-8.2)  g/dL


 


Albumin    4.3  (3.4-5.0)  g/dL


 


Globulin    3.1  (2.6-4.0)  g/dL


 


Albumin/Globulin Ratio    1.4  (0.9-1.6)  


 


Triglycerides     (0-200)  mg/dL


 


Cholesterol     ()  mg/dL


 


LDL Cholesterol, Calc     ()  mg/dL


 


VLDL Cholesterol     (5-55)  mg/dL


 


HDL Cholesterol     (40-60)  mg/dL


 


Cholesterol/HDL Ratio     (3.3-6.0)  


 


TSH 3rd Generation    1.16  (0.36-3.74)  uIU/mL


 


Urine Color     


 


Urine Appearance     


 


Urine pH     (5.0-8.0)  


 


Ur Specific Gravity     (1.001-1.035)  


 


Urine Protein     (NEGATIVE)  mg/dL


 


Urine Glucose (UA)     (NEGATIVE)  mg/dL


 


Urine Ketones     (NEGATIVE)  mg/dL


 


Urine Occult Blood     (NEGATIVE)  


 


Urine Nitrite     (NEGATIVE)  


 


Urine Bilirubin     (NEGATIVE)  


 


Urine Urobilinogen     (<2.0)  EU/dL


 


Ur Leukocyte Esterase     (NEGATIVE)  


 


Urine RBC     (0-2/HPF)  


 


Urine WBC     (0-5/HPF)  


 


Ur Epithelial Cells     (NONE-FEW)  


 


Urine Bacteria     (NEGATIVE)  


 


SARS-CoV-2 RNA (MAXIM)     (NEGATIVE)  














  10/30/20 10/30/20 10/30/20 Range/Units





  11:10 11:10 11:10 


 


WBC     (4.0-11.0)  K/uL


 


RBC     (4.50-5.90)  M/uL


 


Hgb     (13.0-17.0)  g/dL


 


Hct     (38.0-50.0)  %


 


MCV     (80.0-98.0)  fL


 


MCH     (27.0-32.0)  pg


 


MCHC     (31.0-37.0)  g/dL


 


RDW Std Deviation     (28.0-62.0)  fl


 


RDW Coeff of Violeta     (11.0-15.0)  %


 


Plt Count     (150-400)  K/uL


 


MPV     (7.40-12.00)  fL


 


Neut % (Auto)     (48.0-80.0)  %


 


Lymph % (Auto)     (16.0-40.0)  %


 


Mono % (Auto)     (0.0-15.0)  %


 


Eos % (Auto)     (0.0-7.0)  %


 


Baso % (Auto)     (0.0-1.5)  %


 


Neut # (Auto)     (1.4-5.7)  K/uL


 


Lymph # (Auto)     (0.6-2.4)  K/uL


 


Mono # (Auto)     (0.0-0.8)  K/uL


 


Eos # (Auto)     (0.0-0.7)  K/uL


 


Baso # (Auto)     (0.0-0.1)  K/uL


 


Nucleated RBC %     /100WBC


 


Nucleated RBCs #     K/uL


 


ESR  1    (0-19)  mm/hr


 


INR     


 


APTT     (18.6-31.3)  SEC


 


Sodium     (136-148)  mmol/L


 


Potassium     (3.5-5.1)  mmol/L


 


Chloride     ()  mmol/L


 


Carbon Dioxide     (21.0-32.0)  mmol/L


 


BUN     (7.0-18.0)  mg/dL


 


Creatinine     (0.8-1.3)  mg/dL


 


Est Cr Clr Drug Dosing     mL/min


 


Estimated GFR (MDRD)     ml/min


 


Glucose     ()  mg/dL


 


Hemoglobin A1c   5.6   (4.5-6.2)  %


 


Calcium     (8.5-10.1)  mg/dL


 


Phosphorus    2.4 L  (2.6-4.7)  mg/dL


 


Magnesium    2.1  (1.8-2.4)  mg/dL


 


Total Bilirubin     (0.2-1.0)  mg/dL


 


AST     (15-37)  IU/L


 


ALT     (14-63)  IU/L


 


Alkaline Phosphatase     ()  U/L


 


Troponin I     (0.000-0.056)  ng/mL


 


Total Protein     (6.4-8.2)  g/dL


 


Albumin     (3.4-5.0)  g/dL


 


Globulin     (2.6-4.0)  g/dL


 


Albumin/Globulin Ratio     (0.9-1.6)  


 


Triglycerides    70  (0-200)  mg/dL


 


Cholesterol    173  ()  mg/dL


 


LDL Cholesterol, Calc    108  ()  mg/dL


 


VLDL Cholesterol    14  (5-55)  mg/dL


 


HDL Cholesterol    51  (40-60)  mg/dL


 


Cholesterol/HDL Ratio    3.4  (3.3-6.0)  


 


TSH 3rd Generation     (0.36-3.74)  uIU/mL


 


Urine Color     


 


Urine Appearance     


 


Urine pH     (5.0-8.0)  


 


Ur Specific Gravity     (1.001-1.035)  


 


Urine Protein     (NEGATIVE)  mg/dL


 


Urine Glucose (UA)     (NEGATIVE)  mg/dL


 


Urine Ketones     (NEGATIVE)  mg/dL


 


Urine Occult Blood     (NEGATIVE)  


 


Urine Nitrite     (NEGATIVE)  


 


Urine Bilirubin     (NEGATIVE)  


 


Urine Urobilinogen     (<2.0)  EU/dL


 


Ur Leukocyte Esterase     (NEGATIVE)  


 


Urine RBC     (0-2/HPF)  


 


Urine WBC     (0-5/HPF)  


 


Ur Epithelial Cells     (NONE-FEW)  


 


Urine Bacteria     (NEGATIVE)  


 


SARS-CoV-2 RNA (MAXIM)     (NEGATIVE)  














  10/30/20 10/30/20 10/31/20 Range/Units





  13:15 13:35 05:45 


 


WBC    8.47  (4.0-11.0)  K/uL


 


RBC    4.96  (4.50-5.90)  M/uL


 


Hgb    15.1  (13.0-17.0)  g/dL


 


Hct    46.2  (38.0-50.0)  %


 


MCV    93.1  (80.0-98.0)  fL


 


MCH    30.4  (27.0-32.0)  pg


 


MCHC    32.7  (31.0-37.0)  g/dL


 


RDW Std Deviation    45.8  (28.0-62.0)  fl


 


RDW Coeff of Violeta    14  (11.0-15.0)  %


 


Plt Count    298  (150-400)  K/uL


 


MPV    9.90  (7.40-12.00)  fL


 


Neut % (Auto)    45.4 L  (48.0-80.0)  %


 


Lymph % (Auto)    38.6  (16.0-40.0)  %


 


Mono % (Auto)    8.6  (0.0-15.0)  %


 


Eos % (Auto)    7.0  (0.0-7.0)  %


 


Baso % (Auto)    0.4  (0.0-1.5)  %


 


Neut # (Auto)    3.9  (1.4-5.7)  K/uL


 


Lymph # (Auto)    3.3 H  (0.6-2.4)  K/uL


 


Mono # (Auto)    0.7  (0.0-0.8)  K/uL


 


Eos # (Auto)    0.6  (0.0-0.7)  K/uL


 


Baso # (Auto)    0.0  (0.0-0.1)  K/uL


 


Nucleated RBC %    0.0  /100WBC


 


Nucleated RBCs #    0  K/uL


 


ESR     (0-19)  mm/hr


 


INR     


 


APTT     (18.6-31.3)  SEC


 


Sodium     (136-148)  mmol/L


 


Potassium     (3.5-5.1)  mmol/L


 


Chloride     ()  mmol/L


 


Carbon Dioxide     (21.0-32.0)  mmol/L


 


BUN     (7.0-18.0)  mg/dL


 


Creatinine     (0.8-1.3)  mg/dL


 


Est Cr Clr Drug Dosing     mL/min


 


Estimated GFR (MDRD)     ml/min


 


Glucose     ()  mg/dL


 


Hemoglobin A1c     (4.5-6.2)  %


 


Calcium     (8.5-10.1)  mg/dL


 


Phosphorus     (2.6-4.7)  mg/dL


 


Magnesium     (1.8-2.4)  mg/dL


 


Total Bilirubin     (0.2-1.0)  mg/dL


 


AST     (15-37)  IU/L


 


ALT     (14-63)  IU/L


 


Alkaline Phosphatase     ()  U/L


 


Troponin I     (0.000-0.056)  ng/mL


 


Total Protein     (6.4-8.2)  g/dL


 


Albumin     (3.4-5.0)  g/dL


 


Globulin     (2.6-4.0)  g/dL


 


Albumin/Globulin Ratio     (0.9-1.6)  


 


Triglycerides     (0-200)  mg/dL


 


Cholesterol     ()  mg/dL


 


LDL Cholesterol, Calc     ()  mg/dL


 


VLDL Cholesterol     (5-55)  mg/dL


 


HDL Cholesterol     (40-60)  mg/dL


 


Cholesterol/HDL Ratio     (3.3-6.0)  


 


TSH 3rd Generation     (0.36-3.74)  uIU/mL


 


Urine Color  YELLOW    


 


Urine Appearance  CLEAR    


 


Urine pH  7.0    (5.0-8.0)  


 


Ur Specific Gravity  <= 1.005    (1.001-1.035)  


 


Urine Protein  NEGATIVE    (NEGATIVE)  mg/dL


 


Urine Glucose (UA)  NEGATIVE    (NEGATIVE)  mg/dL


 


Urine Ketones  NEGATIVE    (NEGATIVE)  mg/dL


 


Urine Occult Blood  NEGATIVE    (NEGATIVE)  


 


Urine Nitrite  NEGATIVE    (NEGATIVE)  


 


Urine Bilirubin  NEGATIVE    (NEGATIVE)  


 


Urine Urobilinogen  0.2    (<2.0)  EU/dL


 


Ur Leukocyte Esterase  NEGATIVE    (NEGATIVE)  


 


Urine RBC  0-1    (0-2/HPF)  


 


Urine WBC  0-1    (0-5/HPF)  


 


Ur Epithelial Cells  RARE    (NONE-FEW)  


 


Urine Bacteria  RARE    (NEGATIVE)  


 


SARS-CoV-2 RNA (MAXIM)   NEGATIVE   (NEGATIVE)  














  10/31/20 Range/Units





  05:45 


 


WBC   (4.0-11.0)  K/uL


 


RBC   (4.50-5.90)  M/uL


 


Hgb   (13.0-17.0)  g/dL


 


Hct   (38.0-50.0)  %


 


MCV   (80.0-98.0)  fL


 


MCH   (27.0-32.0)  pg


 


MCHC   (31.0-37.0)  g/dL


 


RDW Std Deviation   (28.0-62.0)  fl


 


RDW Coeff of Violeta   (11.0-15.0)  %


 


Plt Count   (150-400)  K/uL


 


MPV   (7.40-12.00)  fL


 


Neut % (Auto)   (48.0-80.0)  %


 


Lymph % (Auto)   (16.0-40.0)  %


 


Mono % (Auto)   (0.0-15.0)  %


 


Eos % (Auto)   (0.0-7.0)  %


 


Baso % (Auto)   (0.0-1.5)  %


 


Neut # (Auto)   (1.4-5.7)  K/uL


 


Lymph # (Auto)   (0.6-2.4)  K/uL


 


Mono # (Auto)   (0.0-0.8)  K/uL


 


Eos # (Auto)   (0.0-0.7)  K/uL


 


Baso # (Auto)   (0.0-0.1)  K/uL


 


Nucleated RBC %   /100WBC


 


Nucleated RBCs #   K/uL


 


ESR   (0-19)  mm/hr


 


INR   


 


APTT   (18.6-31.3)  SEC


 


Sodium  142  (136-148)  mmol/L


 


Potassium  4.5  (3.5-5.1)  mmol/L


 


Chloride  106  ()  mmol/L


 


Carbon Dioxide  29.0  (21.0-32.0)  mmol/L


 


BUN  18  (7.0-18.0)  mg/dL


 


Creatinine  1.2  (0.8-1.3)  mg/dL


 


Est Cr Clr Drug Dosing  59.73  mL/min


 


Estimated GFR (MDRD)  > 60.0  ml/min


 


Glucose  98  ()  mg/dL


 


Hemoglobin A1c   (4.5-6.2)  %


 


Calcium  8.9  (8.5-10.1)  mg/dL


 


Phosphorus  4.0  (2.6-4.7)  mg/dL


 


Magnesium  2.2  (1.8-2.4)  mg/dL


 


Total Bilirubin  0.6  (0.2-1.0)  mg/dL


 


AST  15  (15-37)  IU/L


 


ALT  23  (14-63)  IU/L


 


Alkaline Phosphatase  58  ()  U/L


 


Troponin I   (0.000-0.056)  ng/mL


 


Total Protein  6.6  (6.4-8.2)  g/dL


 


Albumin  3.8  (3.4-5.0)  g/dL


 


Globulin  2.8  (2.6-4.0)  g/dL


 


Albumin/Globulin Ratio  1.4  (0.9-1.6)  


 


Triglycerides   (0-200)  mg/dL


 


Cholesterol   ()  mg/dL


 


LDL Cholesterol, Calc   ()  mg/dL


 


VLDL Cholesterol   (5-55)  mg/dL


 


HDL Cholesterol   (40-60)  mg/dL


 


Cholesterol/HDL Ratio   (3.3-6.0)  


 


TSH 3rd Generation   (0.36-3.74)  uIU/mL


 


Urine Color   


 


Urine Appearance   


 


Urine pH   (5.0-8.0)  


 


Ur Specific Gravity   (1.001-1.035)  


 


Urine Protein   (NEGATIVE)  mg/dL


 


Urine Glucose (UA)   (NEGATIVE)  mg/dL


 


Urine Ketones   (NEGATIVE)  mg/dL


 


Urine Occult Blood   (NEGATIVE)  


 


Urine Nitrite   (NEGATIVE)  


 


Urine Bilirubin   (NEGATIVE)  


 


Urine Urobilinogen   (<2.0)  EU/dL


 


Ur Leukocyte Esterase   (NEGATIVE)  


 


Urine RBC   (0-2/HPF)  


 


Urine WBC   (0-5/HPF)  


 


Ur Epithelial Cells   (NONE-FEW)  


 


Urine Bacteria   (NEGATIVE)  


 


SARS-CoV-2 RNA (MAXIM)   (NEGATIVE)  











Med Orders - Current: 


                               Current Medications





Acetaminophen (Tylenol)  650 mg PO Q4H PRN


   PRN Reason: Pain (Mild 1-3)/fever


Aspirin (Aspirin)  81 mg PO DAILY [facility]


   Last Admin: 10/31/20 09:16 Dose:  81 mg


   Documented by: 


Atorvastatin Calcium (Lipitor)  40 mg PO DAILY [facility]


   Last Admin: 10/31/20 09:14 Dose:  40 mg


   Documented by: 


Enoxaparin Sodium (Lovenox)  40 mg SUBCUT Q24H [facility]


   Last Admin: 10/30/20 20:57 Dose:  40 mg


   Documented by: 


Latanoprost (Xalatan 0.005% Ophth Soln)  0 ml EYEBOTH BEDTIME [facility]


   Last Admin: 10/30/20 20:58 Dose:  Not Given


   Documented by: 


Ondansetron HCl (Zofran)  4 mg IVPUSH Q4H PRN


   PRN Reason: Nausea


Sodium Chloride (Saline Flush)  10 ml FLUSH ASDIRECTED PRN


   PRN Reason: Keep Vein Open


   Last Admin: 10/30/20 12:54 Dose:  10 ml


   Documented by: 


Sodium Chloride (Saline Flush)  2.5 ml FLUSH ASDIRECTED PRN


   PRN Reason: Keep Vein Open


   Last Admin: 10/30/20 12:54 Dose:  2.5 ml


   Documented by: 


Sodium Chloride (Normal Saline)  10 ml IV ASDIRECTED PRN


   PRN Reason: IV Use





Discontinued Medications





Aspirin (Aspirin)  162 mg PO ONETIME ONE


   Stop: 10/30/20 13:07


   Last Admin: 10/30/20 13:28 Dose:  162 mg


   Documented by: 


Atorvastatin Calcium (Lipitor)  40 mg PO ONETIME ONE


   Stop: 10/30/20 18:16


   Last Admin: 10/30/20 18:17 Dose:  40 mg


   Documented by: 


Clopidogrel Bisulfate (Plavix)  600 mg PO ONETIME ONE


   Stop: 10/30/20 13:06


   Last Admin: 10/30/20 13:29 Dose:  600 mg


   Documented by: 


Iopamidol (Isovue Multipack-370 (76%))  100 ml IVPUSH ONETIME ONE


   Stop: 10/30/20 11:30


   Last Admin: 10/30/20 11:29 Dose:  100 ml


   Documented by: 


Sodium Phosphate (Neutra-Phos)  250 mg PO BID [facility]


   Stop: 10/31/20 09:01


   Last Admin: 10/31/20 09:15 Dose:  250 mg


   Documented by:

## 2020-11-03 NOTE — ECHO
EXAM DATE: 10/30/20



PATIENT'S AGE: 65



The ECHO report has been scanned into [employer] and can be seen in this patient's
EMR (Electronic Medical Record) under the REPORTS section. The report has also 
been scanned into PACS.



ALEYDA